# Patient Record
Sex: MALE | Race: WHITE | URBAN - METROPOLITAN AREA
[De-identification: names, ages, dates, MRNs, and addresses within clinical notes are randomized per-mention and may not be internally consistent; named-entity substitution may affect disease eponyms.]

---

## 2022-11-15 ENCOUNTER — OFFICE (OUTPATIENT)
Dept: URBAN - METROPOLITAN AREA CLINIC 6 | Facility: CLINIC | Age: 22
Setting detail: OPHTHALMOLOGY
End: 2022-11-15
Payer: COMMERCIAL

## 2022-11-15 DIAGNOSIS — G43.009: ICD-10-CM

## 2022-11-15 DIAGNOSIS — H50.52: ICD-10-CM

## 2022-11-15 DIAGNOSIS — H52.03: ICD-10-CM

## 2022-11-15 PROCEDURE — 92004 COMPRE OPH EXAM NEW PT 1/>: CPT | Performed by: OPHTHALMOLOGY

## 2022-11-15 PROCEDURE — 92015 DETERMINE REFRACTIVE STATE: CPT | Performed by: OPHTHALMOLOGY

## 2022-11-15 ASSESSMENT — REFRACTION_AUTOREFRACTION
OD_CYLINDER: -0.50
OS_AXIS: 030
OD_AXIS: 170
OS_SPHERE: +0.25
OS_CYLINDER: -0.25
OD_SPHERE: +0.25

## 2022-11-15 ASSESSMENT — REFRACTION_MANIFEST
OD_SPHERE: +0.25
OD_AXIS: 170
OS_AXIS: 030
OD_SPHERE: PLANO
OU_VA: 20/20-1
OU_VA: 20/20-1
OS_SPHERE: PLANO
OD_CYLINDER: -0.50
OS_CYLINDER: -0.25
OS_VA1: 20/20-1
OD_AXIS: 170
OS_SPHERE: +0.25
OS_AXIS: 030
OD_VA1: 20/20-2
OS_VA1: 20/20-1
OD_VA1: 20/20-2
OD_CYLINDER: -0.50
OS_CYLINDER: -0.25

## 2022-11-15 ASSESSMENT — AXIALLENGTH_DERIVED
OS_AL: 24.7954
OD_AL: 24.8493
OD_AL: 24.8493
OS_AL: 24.7954

## 2022-11-15 ASSESSMENT — KERATOMETRY
OD_AXISANGLE_DEGREES: 085
OS_K1POWER_DIOPTERS: 39.75
OD_K1POWER_DIOPTERS: 39.50
OS_K2POWER_DIOPTERS: 40.75
OD_K2POWER_DIOPTERS: 41.00
OS_AXISANGLE_DEGREES: 090

## 2022-11-15 ASSESSMENT — TONOMETRY
OD_IOP_MMHG: 17
OS_IOP_MMHG: 17

## 2022-11-15 ASSESSMENT — SPHEQUIV_DERIVED
OS_SPHEQUIV: 0.125
OS_SPHEQUIV: 0.125
OD_SPHEQUIV: 0
OD_SPHEQUIV: 0

## 2022-11-15 ASSESSMENT — VISUAL ACUITY
OD_BCVA: 20/25+1
OS_BCVA: 20/20-2

## 2022-11-15 ASSESSMENT — CONFRONTATIONAL VISUAL FIELD TEST (CVF)
OD_FINDINGS: FULL
OS_FINDINGS: FULL

## 2022-11-16 PROBLEM — H50.52 EXOPHORIA: Status: ACTIVE | Noted: 2022-11-15

## 2022-11-16 PROBLEM — G43.009 MIGRAINE-W/O AURA: Status: ACTIVE | Noted: 2022-11-15

## 2022-12-20 PROBLEM — Z00.00 ENCOUNTER FOR PREVENTIVE HEALTH EXAMINATION: Status: ACTIVE | Noted: 2022-12-20

## 2023-01-12 ENCOUNTER — APPOINTMENT (OUTPATIENT)
Dept: OTOLARYNGOLOGY | Facility: CLINIC | Age: 23
End: 2023-01-12
Payer: MEDICAID

## 2023-01-12 VITALS
HEART RATE: 65 BPM | TEMPERATURE: 98.1 F | HEIGHT: 75 IN | BODY MASS INDEX: 30.46 KG/M2 | DIASTOLIC BLOOD PRESSURE: 78 MMHG | SYSTOLIC BLOOD PRESSURE: 119 MMHG | WEIGHT: 245 LBS

## 2023-01-12 DIAGNOSIS — Z78.9 OTHER SPECIFIED HEALTH STATUS: ICD-10-CM

## 2023-01-12 DIAGNOSIS — Z83.3 FAMILY HISTORY OF DIABETES MELLITUS: ICD-10-CM

## 2023-01-12 PROCEDURE — 99204 OFFICE O/P NEW MOD 45 MIN: CPT

## 2023-01-12 NOTE — ASSESSMENT
[FreeTextEntry1] : Pt with 3 separate lesions in the neck that are suspicious for branchial cleft cysts.  \par \par R sided lesion may be solid rather than cystic.  Will obtain FNA and pt will f/u.  In all likelihood we will need to remove all three lesions.  Pt questioned whether observation was a viable option, since they cause him no discomfort.  I explained that it is an option, but we also discussed the potential risks that he would incur, including the potential for infection and the increased difficulty that would be associated with removal after nay infection.  \par \par Pt to f/u after the FNA's are done.

## 2023-01-12 NOTE — HISTORY OF PRESENT ILLNESS
[de-identified] : 22 year old male referred by Dr Baltazar for neck mass. States had Strep throat 2 months ago,developed a peritonsillar abscess, abscess was drained, and  CT was done where Dr Baltazar found more neck masses. States peritonsillar abscess returned a second time, resolved on its own. States currently feels breathing is restricted, feels there is something inside his throat, swallowing is not painful but uncomfortable, has a lot of phlegm production throughout the day, able to cough green phlegm at times and has constant throat clearing. Denies odynophagia, dysphonia, night sweats, chills, fevers, or otalgia.\par

## 2023-01-12 NOTE — DATA REVIEWED
[de-identified] : \par Office Location: [address] [place] [address], [place], [address]\par Office Phone: [phone]\par Office Fax: [phone]\par PATIENT NAME: Maryana Minaya\par PATIENT PHONE NUMBER: [phone]\par PATIENT ID: [id number]\par : 2000\par DATE OF EXAM: 2023\par R. Phys. Name: Chris Baltazar\par R. Phys. Address: [address] [place] [address], [place] [address]\par R. Phys. Phone: [phone]\par FINE NEEDLE ASPIRATION NECK WITH ULTRASOUND GUIDANCE\par \par HISTORY: M54.2 Cervical/ Neck Pain\par \par Following obtaining informed consent with the risks explained including but not\par limited to hemorrhage, the patient's neck was examined and the suspicious left\par neck, level 2 lymph node was identified. The overlying skin was prepped in the\par usual sterile fashion. Using real-time gray-scale ultrasound imaging, a\par 27-gauge needle was advanced into the lymph node and specimen was obtained.\par This was placed in the appropriate preservative solution. A second "pass" was\par made in similar fashion for specimen to be placed in RPMI solution. The patient\par tolerated the procedure well. A band-aid was applied to the needle insertion\par sites.\par \par IMPRESSION:\par \par \par Successful ultrasound guided fine needle aspiration biopsy of left neck, level 2\par lymph node. R59.0\par \par Signed by: Joselito Coughlin MD\par Signed Date: 2023 4:56 PM EST\par \par \par \par SIGNED BY: Joselito Coughlin M.D., Ext. [id number] 2023 04:56 PM\par \par ADDENDUM:\par This is an addendum to the ultrasound-guided fine-needle aspiration biopsy of\par the left neck, level 2 lymph node on 2023\par \par Final pathology report for the left cervical, level 2 lymph node reveals:\par \par Benign-appearing squamous cells and lymphoid cells noted.\par Differential diagnosis includes tonsillar cells, squamous cell-lined lesion,\par such as bronchial cleft cyst or a lymphoepithelial cyst.\par Flow cytometry reveals: Features of a lymphoproliferative disorder are not seen.\par \par Follow-up ultrasound exams to confirm stability/regression are recommended.\par \par \par \par Flores at Dr. Baltazar's office confirmed receipt of this report on 1/10/2023 and\par stated that the report was sent to the doctor for their review\par \par Signed by: Joselito Coughlin MD\par Signed Date: 2023 4:01 PM EST\par \par \par \par IMPRESSION:\par \par \par Successful ultrasound guided fine needle aspiration biopsy of left neck, level 2\par lymph node. R59.0\par \par Signed by: Joselito Coughlin MD\par Signed Date: 2023 4:56 PM EST\par \par \par \par SIGNED BY: Joselito Coughlin M.D., Ext. [id number] 2023 04:56 PM\par \par ADDENDUM:\par This is an addendum to the ultrasound-guided fine-needle aspiration biopsy of\par the left neck, level 2 lymph node on 2023\par \par Final pathology report for the left cervical, level 2 lymph node reveals:\par \par Benign-appearing squamous cells and lymphoid cells noted.\par Differential diagnosis includes tonsillar cells, squamous cell-lined lesion,\par such as bronchial cleft cyst or a lymphoepithelial cyst.\par Flow cytometry reveals: Features of a lymphoproliferative disorder are not seen.\par \par Follow-up ultrasound exams to confirm stability/regression are recommended.\par \par \par \par Flores at Dr. Baltazar's office confirmed receipt of this report on 1/10/2023 and\par stated that the report was sent to the doctor for their review\par \par Signed by: Joselito Coughlin MD\par Signed Date: 2023 4:01 PM EST\par \par  [de-identified] : IMAGES REVIEWED:\par \par Office Location: [address] [place] [address], [place], [address]\par Office Phone: [phone]\par Office Fax: [phone]\par PATIENT NAME: Maryana Minaya\par PATIENT PHONE NUMBER: [phone]\par PATIENT ID: [id number]\par : 2000\par DATE OF EXAM: 2023\par R. Phys. Name: Chris Baltazar\par R. Phys. Address: [address] [place] [address], [address], [facility]\par R. Phys. Phone: [phone]\par MRI CHEST WITHOUT AND WITH CONTRAST\par \par HISTORY: Cystic neck lesion. R53.83 Fatigue\par \par TECHNIQUE: Pre and postcontrast MRI of the chest was performed on a 3 Laura\par magnet. 19 cc Gadoterate Meglumine was administered.\par \par COMPARISON: None.\par \par FINDINGS:\par \par Thyroid/neck: Demonstrates homogeneous signal and normal size. T\par \par here is a nonenhancing cystic lesion in the midline anterior lower neck along\par the inferior aspect of the thyroid demonstrating a fluid/fluid measuring 4.7 x\par 3.3 x 3.9 cm.\par \par Mediastinum: The heart is normal in size. No pericardial effusion. The pulmonary\par arteries and thoracic aorta are normal caliber. No evidence of central pulmonary\par pulmonary embolism.\par \par Lymph nodes: No lymphadenopathy.\par \par Lungs: No parenchymal signal abnormality. No pleural effusion.\par \par Musculoskeletal: No aggressive osseous lesions.\par \par Upper abdomen: Unremarkable included upper abdominal organs.\par \par IMPRESSION:\par \par \par \par Nonspecific midline lower neck cyst abutting the inferior aspect of the thyroid.\par \par Signed by: Pastor Mccall MD\par Signed Date: 1/10/2023 3:23 PM EST\par \par \par \par SIGNED BY: Pastor Mccall M.D., Ext. [id number] 01/10/2023 03:23 PM\par \par IMPRESSION:\par \par \par \par Nonspecific midline lower neck cyst abutting the inferior aspect of the thyroid.\par \par Signed by: Pastor Mccall MD\par Signed Date: 1/10/2023 3:23 PM EST\par \par \par Office Location: [address] [place] [address] [place], [address]\par Office Phone: [phone]\par Office Fax: [phone]\par PATIENT NAME: Maryana Minaya\par PATIENT PHONE NUMBER: [phone]\par PATIENT ID: [id number]\par : 2000\par DATE OF EXAM: 12/15/2022\par R. Phys. Name: Chris Baltazar\par R. Phys. Address: [address] [place] [address], [place] [address]\par R. Phys. Phone: [phone]\par MRI-ORBIT FACE NECK PRE AND POST IV CONTRAST\par \par HISTORY: M54.81 Head/Neck Pain\par \par TECHNIQUE: Multiplanar multisequence MRI of the Neck was performed. The study\par was performed on a 1.5 Laura high field wide bore magnet.\par \par COMPARISON: None available.\par \par FINDINGS:\par \par Lymph nodes: There is a 3.9 x 2.6 x 2.1 cm enhancing mass with an approximately\par 16 mm cystic component in the left lateral neck, which may reflect\par pathologically enlarged left level 2A lymph node. There is an enlarged right\par level 2A lymph node measuring 3.5 x 1.9 x 1.5 cm.\par \par Aerodigestive structures: There is significant enlargement of bilateral palatine\par tonsils with small cystic components.\par \par Thyroid gland: Imaged only imaged entirely only on coronal sequences with no\par focal masses identified.\par \par Parotid and submandibular glands: Unremarkable.\par \par Paranasal sinuses: Clear.\par \par Mastoid air cells: Clear.\par \par Partially visualized orbits and intracranial structures: Unremarkable\par \par Other: Incompletely imaged is a cystic-appearing lesion anterior to the trachea.\par \par IMPRESSION:\par \par \par Bilateral level 2A lymphadenopathy with a cystic component on the left,\par nonspecific. Tissue sampling is recommended.\par \par Significant bilateral palatine tonsillar enlargement, nonspecific. Correlation\par with direct visualization is recommended.\par \par Cystic-appearing lesion anterior to the trachea, incompletely imaged. Evaluation\par with a contrast-enhanced MRI or CT of the chest is recommended.\par \par I requested that my report be faxed to the referring physician's office with\par confirmation of receipt.\par \par \par \par \par Signed by: Ana Maria Carrillo MD\par Signed Date: 12/15/2022 10:31 PM EST\par \par \par \par SIGNED BY: Ana Maria Carrillo M.D., Ext. [id number] 12/15/2022 10:31 PM

## 2023-01-12 NOTE — CONSULT LETTER
[Consult Letter:] : I had the pleasure of evaluating your patient, [unfilled]. [Please see my note below.] : Please see my note below. [Consult Closing:] : Thank you very much for allowing me to participate in the care of this patient.  If you have any questions, please do not hesitate to contact me. [Sincerely,] : Sincerely, [Dear  ___] : Dear  [unfilled], [FreeTextEntry2] : Chris Baltazar MD [FreeTextEntry3] : Jason Perry MD, FACS\par Chief of Otolaryngology at [facility] [place]\par \par Dept. of Otolaryngology\par [facility]\par

## 2023-01-12 NOTE — PHYSICAL EXAM
[de-identified] : Bilateral, palpable, NT level II neck masses vs cysts.  3rd cystic lesion at sternal notch, extending bleow sternum. [Midline] : trachea located in midline position [Normal] : no rashes

## 2023-01-23 ENCOUNTER — RESULT REVIEW (OUTPATIENT)
Age: 23
End: 2023-01-23

## 2023-01-30 ENCOUNTER — APPOINTMENT (OUTPATIENT)
Dept: ULTRASOUND IMAGING | Facility: CLINIC | Age: 23
End: 2023-01-30

## 2023-02-02 ENCOUNTER — OUTPATIENT (OUTPATIENT)
Dept: OUTPATIENT SERVICES | Facility: HOSPITAL | Age: 23
LOS: 1 days | End: 2023-02-02
Payer: MEDICAID

## 2023-02-02 ENCOUNTER — RESULT REVIEW (OUTPATIENT)
Age: 23
End: 2023-02-02

## 2023-02-02 ENCOUNTER — APPOINTMENT (OUTPATIENT)
Dept: ULTRASOUND IMAGING | Facility: CLINIC | Age: 23
End: 2023-02-02
Payer: MEDICAID

## 2023-02-02 ENCOUNTER — APPOINTMENT (OUTPATIENT)
Dept: ULTRASOUND IMAGING | Facility: CLINIC | Age: 23
End: 2023-02-02

## 2023-02-02 DIAGNOSIS — Z00.8 ENCOUNTER FOR OTHER GENERAL EXAMINATION: ICD-10-CM

## 2023-02-02 DIAGNOSIS — R22.1 LOCALIZED SWELLING, MASS AND LUMP, NECK: ICD-10-CM

## 2023-02-02 PROCEDURE — 10006 FNA BX W/US GDN EA ADDL: CPT

## 2023-02-02 PROCEDURE — 88173 CYTOPATH EVAL FNA REPORT: CPT | Mod: 26

## 2023-02-02 PROCEDURE — 88305 TISSUE EXAM BY PATHOLOGIST: CPT

## 2023-02-02 PROCEDURE — 88305 TISSUE EXAM BY PATHOLOGIST: CPT | Mod: 26

## 2023-02-02 PROCEDURE — 88173 CYTOPATH EVAL FNA REPORT: CPT

## 2023-02-02 PROCEDURE — 10005 FNA BX W/US GDN 1ST LES: CPT

## 2023-02-06 LAB — NON-GYNECOLOGICAL CYTOLOGY STUDY: SIGNIFICANT CHANGE UP

## 2023-03-01 ENCOUNTER — APPOINTMENT (OUTPATIENT)
Dept: OTOLARYNGOLOGY | Facility: CLINIC | Age: 23
End: 2023-03-01
Payer: MEDICAID

## 2023-03-01 VITALS
DIASTOLIC BLOOD PRESSURE: 77 MMHG | WEIGHT: 245 LBS | BODY MASS INDEX: 43.41 KG/M2 | TEMPERATURE: 97.7 F | HEIGHT: 63 IN | SYSTOLIC BLOOD PRESSURE: 136 MMHG | HEART RATE: 70 BPM

## 2023-03-01 DIAGNOSIS — R22.1 LOCALIZED SWELLING, MASS AND LUMP, NECK: ICD-10-CM

## 2023-03-01 PROCEDURE — 99214 OFFICE O/P EST MOD 30 MIN: CPT

## 2023-03-01 NOTE — ASSESSMENT
[FreeTextEntry1] : Pt to schedule excision of both cystic lesions in his neck. Risks d/w pt, including, but not limited to, bleeding, infection, nerve injury, and the potential for keloid scarring. Pt understands and wishes to proceed.

## 2023-03-01 NOTE — PHYSICAL EXAM
[Midline] : trachea located in midline position [Normal] : no rashes [de-identified] : Bilateral, palpable, NT level II neck masses vs cysts.  3rd cystic lesion at sternal notch, extending bleow sternum. [de-identified] : Edematous [de-identified] : 3+

## 2023-03-01 NOTE — HISTORY OF PRESENT ILLNESS
[de-identified] : 22 year old male presents for follow up for neck mass.  He reports new symptoms including a sensation  that his breathing is restricted, feels there is something inside his throat, swallowing is not painful but uncomfortable, has a lot of phlegm production throughout the day, able to cough green phlegm at times, raspy voice at times and has constant throat clearing. Denies odynophagia, night sweats, chills, fevers, or otalgia.\par \par

## 2023-03-01 NOTE — DATA REVIEWED
[de-identified] : \par  US Fine Needle Aspiration Lymph Node 1st Lesion             Amended\par \par No Documents Attached\par \par \par \par \par   EXAM: [id number] - US FNA LYMPH NODE 1ST LES SISC  - ORDERED BY: MAKEDA BYERS\par \par EXAM: [id number] - US FNA OTHER 1ST LES SISC  - ORDERED BY: MAKEDA BYERS\par \par \par *** ADDENDUM ***\par \par CYTOLOGY RESULTS RECEIVED:\par \par The cytology report of the right level 2 cervical lymph node and suprasternal cystic mass FNA biopsy indicates the following results:\par \par Site #1. Right cervical level 2 lymph node negative for malignant cells. Flow cytometry demonstrates no diagnostic abnormalities.\par \par Site #2. Suprasternal cystic mass yielded nondiagnostic. Acellular specimen.\par \par IMPRESSION:  Results are BENIGN AND CONCORDANT.\par \par RECOMMENDATION:  Clinical Follow Up.\par \par --- End of Report ---\par \par *** END OF ADDENDUM ***\par \par \par PROCEDURE DATE:  02/02/2023\par \par \par \par INTERPRETATION:  Clinical Information: 22-year-old male who presents for ultrasound-guided fine-needle aspiration biopsy of an enlarged right cervical level 2 lymph node, and of a cystic mass located sternal notch region.\par \par Comparison: Neck ultrasound 1/5/2023, MR chest 1/5/2023 and MR neck 12/5/2022.\par \par COMMENTS:\par \par Consent: After the patient's identification was confirmed, the procedure was explained to the patient. The potential risks, benefits, and alternatives to fine needle aspiration biopsy were discussed with the patient, including possible complications of bleeding. Informed consent was then obtained.\par \par Cervical Ultrasound: Real-time sonography of the right cervical region and sternal notch region was performed prior to the procedure. Examination again demonstrates an enlarged right cervical level 2 lymph node with thickened cortex and a cystic mass noted in the sternal notch region. Plan is for 2 site ultrasound-guided fine-needle aspiration biopsy.\par \par Site #1. Midline sternal notch region 4.6 cm cystic mass located inferior to the thyroid gland.\par \par Site #2. Right cervical level 2 lymph node 2.5 cm.\par \par Technique:  Utilizing aseptic technique with betadine and local anesthesia with 1% lidocaine 2 passes of 25 gauge needle and 22-gauge needle were made into the sternal notch cystic mass under direct real-time sonographic visualization. 3 passes of 25-gauge needles were made into the right cervical level 2 lymph node.\par \par Specimen sent for cytologic evaluation and flow cytometry.\par \par The patient tolerated the procedure well without complication.\par \par IMPRESSION:\par \par Successful ultrasound-guided fine-needle aspiration biopsy of a 4.6 cm cystic mass located mid line sternal notch region, and a 2.5 cm right cervical level 2 lymph node.\par \par Specimen sent for cytologic evaluation and flow cytometry.\par \par --- End of Report ---\par \par \par ***Please see the addendum at the top of this report. It may contain additional important information or changes.****\par \par \par \par MILA VASQUEZ MD; Attending Radiologist\par This document has been electronically signed. Feb 2 2023 12:02PM\par 1st Addendum: MILA VASQUEZ MD; Attending Radiologist\par The first addendum was electronically signed on: Feb 13 2023 10:47AM.\par \par  \par \par  Ordered by: MAKEDA BYERS       Collected/Examined: [id number] 11:56AM       \par Verified by: MAKEDA BYERS [id number] 06:16PM       \par  Result Communication: No patient communication needed at this time;\par Stage: Amended       \par  Performed at: [facility]       Resulted: [id number] 10:44AM       Last Updated: [id number] 06:16PM       Accession: [id number]        [de-identified] : MRI ([facility] 1/5/23) - Images reviewed and show a midline, low anterior cystic lesion and a L level  II / III neck cyst.

## 2023-03-01 NOTE — CONSULT LETTER
[Consult Letter:] : I had the pleasure of evaluating your patient, [unfilled]. [Please see my note below.] : Please see my note below. [Consult Closing:] : Thank you very much for allowing me to participate in the care of this patient.  If you have any questions, please do not hesitate to contact me. [Sincerely,] : Sincerely, [Dear  ___] : Dear  [unfilled], [FreeTextEntry2] : Chris Baltazar MD [FreeTextEntry3] : Jason Perry MD, FACS\par Chief of Otolaryngology and Head & Neck Surgery\par [facility]\par  - Dept. of Otolaryngology\par [facility]

## 2023-03-31 ENCOUNTER — OUTPATIENT (OUTPATIENT)
Dept: OUTPATIENT SERVICES | Facility: HOSPITAL | Age: 23
LOS: 1 days | End: 2023-03-31
Payer: COMMERCIAL

## 2023-03-31 VITALS
DIASTOLIC BLOOD PRESSURE: 78 MMHG | TEMPERATURE: 98 F | HEART RATE: 57 BPM | RESPIRATION RATE: 16 BRPM | SYSTOLIC BLOOD PRESSURE: 100 MMHG | OXYGEN SATURATION: 100 % | HEIGHT: 75 IN | WEIGHT: 244.71 LBS

## 2023-03-31 DIAGNOSIS — Q18.0 SINUS, FISTULA AND CYST OF BRANCHIAL CLEFT: ICD-10-CM

## 2023-03-31 DIAGNOSIS — Z01.818 ENCOUNTER FOR OTHER PREPROCEDURAL EXAMINATION: ICD-10-CM

## 2023-03-31 DIAGNOSIS — Z13.89 ENCOUNTER FOR SCREENING FOR OTHER DISORDER: ICD-10-CM

## 2023-03-31 DIAGNOSIS — Z29.9 ENCOUNTER FOR PROPHYLACTIC MEASURES, UNSPECIFIED: ICD-10-CM

## 2023-03-31 DIAGNOSIS — Z78.9 OTHER SPECIFIED HEALTH STATUS: Chronic | ICD-10-CM

## 2023-03-31 DIAGNOSIS — R22.1 LOCALIZED SWELLING, MASS AND LUMP, NECK: ICD-10-CM

## 2023-03-31 LAB
A1C WITH ESTIMATED AVERAGE GLUCOSE RESULT: 5.2 % — SIGNIFICANT CHANGE UP (ref 4–5.6)
ANION GAP SERPL CALC-SCNC: 10 MMOL/L — SIGNIFICANT CHANGE UP (ref 5–17)
APTT BLD: 31.8 SEC — SIGNIFICANT CHANGE UP (ref 27.5–35.5)
BASOPHILS # BLD AUTO: 0.06 K/UL — SIGNIFICANT CHANGE UP (ref 0–0.2)
BASOPHILS NFR BLD AUTO: 1 % — SIGNIFICANT CHANGE UP (ref 0–2)
BUN SERPL-MCNC: 16.2 MG/DL — SIGNIFICANT CHANGE UP (ref 8–20)
CALCIUM SERPL-MCNC: 9.4 MG/DL — SIGNIFICANT CHANGE UP (ref 8.4–10.5)
CHLORIDE SERPL-SCNC: 101 MMOL/L — SIGNIFICANT CHANGE UP (ref 96–108)
CO2 SERPL-SCNC: 24 MMOL/L — SIGNIFICANT CHANGE UP (ref 22–29)
CREAT SERPL-MCNC: 1 MG/DL — SIGNIFICANT CHANGE UP (ref 0.5–1.3)
EGFR: 108 ML/MIN/1.73M2 — SIGNIFICANT CHANGE UP
EOSINOPHIL # BLD AUTO: 0.14 K/UL — SIGNIFICANT CHANGE UP (ref 0–0.5)
EOSINOPHIL NFR BLD AUTO: 2.2 % — SIGNIFICANT CHANGE UP (ref 0–6)
ESTIMATED AVERAGE GLUCOSE: 103 MG/DL — SIGNIFICANT CHANGE UP (ref 68–114)
GLUCOSE SERPL-MCNC: 91 MG/DL — SIGNIFICANT CHANGE UP (ref 70–99)
HCT VFR BLD CALC: 45 % — SIGNIFICANT CHANGE UP (ref 39–50)
HGB BLD-MCNC: 14.6 G/DL — SIGNIFICANT CHANGE UP (ref 13–17)
IMM GRANULOCYTES NFR BLD AUTO: 0.2 % — SIGNIFICANT CHANGE UP (ref 0–0.9)
INR BLD: 1.15 RATIO — SIGNIFICANT CHANGE UP (ref 0.88–1.16)
LYMPHOCYTES # BLD AUTO: 2.09 K/UL — SIGNIFICANT CHANGE UP (ref 1–3.3)
LYMPHOCYTES # BLD AUTO: 33.4 % — SIGNIFICANT CHANGE UP (ref 13–44)
MCHC RBC-ENTMCNC: 27.3 PG — SIGNIFICANT CHANGE UP (ref 27–34)
MCHC RBC-ENTMCNC: 32.4 GM/DL — SIGNIFICANT CHANGE UP (ref 32–36)
MCV RBC AUTO: 84.3 FL — SIGNIFICANT CHANGE UP (ref 80–100)
MONOCYTES # BLD AUTO: 0.39 K/UL — SIGNIFICANT CHANGE UP (ref 0–0.9)
MONOCYTES NFR BLD AUTO: 6.2 % — SIGNIFICANT CHANGE UP (ref 2–14)
NEUTROPHILS # BLD AUTO: 3.56 K/UL — SIGNIFICANT CHANGE UP (ref 1.8–7.4)
NEUTROPHILS NFR BLD AUTO: 57 % — SIGNIFICANT CHANGE UP (ref 43–77)
PLATELET # BLD AUTO: 227 K/UL — SIGNIFICANT CHANGE UP (ref 150–400)
POTASSIUM SERPL-MCNC: 5 MMOL/L — SIGNIFICANT CHANGE UP (ref 3.5–5.3)
POTASSIUM SERPL-SCNC: 5 MMOL/L — SIGNIFICANT CHANGE UP (ref 3.5–5.3)
PROTHROM AB SERPL-ACNC: 13.4 SEC — SIGNIFICANT CHANGE UP (ref 10.5–13.4)
RBC # BLD: 5.34 M/UL — SIGNIFICANT CHANGE UP (ref 4.2–5.8)
RBC # FLD: 12.7 % — SIGNIFICANT CHANGE UP (ref 10.3–14.5)
SODIUM SERPL-SCNC: 135 MMOL/L — SIGNIFICANT CHANGE UP (ref 135–145)
WBC # BLD: 6.25 K/UL — SIGNIFICANT CHANGE UP (ref 3.8–10.5)
WBC # FLD AUTO: 6.25 K/UL — SIGNIFICANT CHANGE UP (ref 3.8–10.5)

## 2023-03-31 PROCEDURE — 80048 BASIC METABOLIC PNL TOTAL CA: CPT

## 2023-03-31 PROCEDURE — 85730 THROMBOPLASTIN TIME PARTIAL: CPT

## 2023-03-31 PROCEDURE — 36415 COLL VENOUS BLD VENIPUNCTURE: CPT

## 2023-03-31 PROCEDURE — 85610 PROTHROMBIN TIME: CPT

## 2023-03-31 PROCEDURE — G0463: CPT

## 2023-03-31 PROCEDURE — 83036 HEMOGLOBIN GLYCOSYLATED A1C: CPT

## 2023-03-31 PROCEDURE — 85025 COMPLETE CBC W/AUTO DIFF WBC: CPT

## 2023-03-31 RX ORDER — CEFAZOLIN SODIUM 1 G
2000 VIAL (EA) INJECTION ONCE
Refills: 0 | Status: DISCONTINUED | OUTPATIENT
Start: 2023-03-31 | End: 2023-03-31

## 2023-03-31 RX ORDER — SODIUM CHLORIDE 9 MG/ML
3 INJECTION INTRAMUSCULAR; INTRAVENOUS; SUBCUTANEOUS EVERY 8 HOURS
Refills: 0 | Status: DISCONTINUED | OUTPATIENT
Start: 2023-03-31 | End: 2023-04-15

## 2023-03-31 RX ORDER — CEFAZOLIN SODIUM 1 G
2000 VIAL (EA) INJECTION ONCE
Refills: 0 | Status: DISCONTINUED | OUTPATIENT
Start: 2023-03-31 | End: 2023-04-15

## 2023-03-31 RX ORDER — ACETAMINOPHEN 500 MG
975 TABLET ORAL ONCE
Refills: 0 | Status: DISCONTINUED | OUTPATIENT
Start: 2023-03-31 | End: 2023-04-15

## 2023-03-31 NOTE — H&P PST ADULT - ENMT COMMENTS
Bilateral, palpable, NT level II neck masses vs cysts. 3rd cystic lesion at sternal notch palpable mass x2 left side neck

## 2023-03-31 NOTE — H&P PST ADULT - HISTORY OF PRESENT ILLNESS
21yo M p/w left side neck mass x2, patient reports a/w tenderness, denies pain. He does reports slight difficulty swallowing a/w mass. Denies night sweats, chills, fevers, or otalgia.  As per DR. Perry CT/MRI revealed midline, low anterior cystic lesion and a L level II / III neck cyst, biopsy performed 02/2023 and cytology of right level 2 cervical lymph node and suprasternal cystic mass FNA biopsy revealed right cervical level 2 lymph node negative for malignant cells, flow cytometry demonstrates no diagnostic abnormalities, suprasternal cystic mass yielded nondiagnostic, acellular specimen, results are BENIGN AND CONCORDANT, and patient is now planned for excision of both cystic lesions in his neck. Otherwise patient reports feeling well today and appears in no acute distress.      23yo M p/w left side neck mass x2, patient reports a/w tenderness, denies pain. He does reports slight difficulty swallowing a/w mass. Denies night sweats, chills, fevers, or otalgia.  As per DR. Perry CT/MRI revealed midline, low anterior cystic lesion and a L level II / III neck cyst, biopsy performed 02/2023 and cytology of right level 2 cervical lymph node and suprasternal cystic mass FNA biopsy revealed right cervical level 2 lymph node negative for malignant cells, flow cytometry demonstrates no diagnostic abnormalities, suprasternal cystic mass yielded nondiagnostic, acellular specimen, pathology results benign, and patient is now planned for excision of both cystic lesions in his neck. Otherwise patient reports feeling well today and appears in no acute distress.

## 2023-03-31 NOTE — H&P PST ADULT - PROBLEM SELECTOR PLAN 3
cap 4- Moderate Risk,  SCDs ordered for day of procedure.  Surgical team to assess need for VTE prophylaxis

## 2023-03-31 NOTE — H&P PST ADULT - ASSESSMENT
23yo M p/w left side neck mass x2, patient reports a/w tenderness, denies pain. He does reports slight difficulty swallowing a/w mass. Denies night sweats, chills, fevers, or otalgia.  As per DR. 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/2016    OPIOID RISK TOOL    DARLING EACH BOX THAT APPLIES AND ADD TOTALS AT THE END    FAMILY HISTORY OF SUBSTANCE ABUSE                 FEMALE         MALE                                                Alcohol                             [  ]1 pt          [  ]3pts                                               Illegal Durgs                     [  ]2 pts        [  ]3pts                                               Rx Drugs                           [  ]4 pts        [  ]4 pts    PERSONAL HISTORY OF SUBSTANCE ABUSE                                                                                          Alcohol                             [  ]3 pts       [  ]3 pts                                               Illegal Drugs                     [  ]4 pts        [  ]4 pts                                               Rx Drugs                           [  ]5 pts        [  ]5 pts    AGE BETWEEN 16-45 YEARS                                      [ x ]1 pt         [  ]1 pt    HISTORY OF PREADOLESCENT   SEXUAL ABUSE                                                             [  ]3 pts        [  ]0pts    PSYCHOLOGICAL DISEASE                     ADD, OCD, Bipolar, Schizophrenia        [  ]2 pts         [  ]2 pts                      Depression                                               [  ]1 pt           [  ]1 pt           SCORING TOTAL   (add numbers and type here)              (1)                                     A score of 3 or lower indicated LOW risk for future opioid abuse  A score of 4 to 7 indicated moderate risk for future opioid abuse  A score of 8 or higher indicates a high risk for opioid abuse        CAPRINI SCORE    AGE RELATED RISK FACTORS                                                             [ ] Age 41-60 years                                            (1 Point)  [ ] Age: 61-74 years                                           (2 Points)                 [ ] Age= 75 years                                                (3 Points)             DISEASE RELATED RISK FACTORS                                                       [ ] Edema in the lower extremities                 (1 Point)                     [ ] Varicose veins                                               (1 Point)                                 [ x] BMI > 25 Kg/m2                                            (1 Point)                                  [ ] Serious infection (ie PNA)                            (1 Point)                     [ ] Lung disease ( COPD, Emphysema)            (1 Point)                                                                          [ ] Acute myocardial infarction                         (1 Point)                  [ ] Congestive heart failure (in the previous month)  (1 Point)         [ ] Inflammatory bowel disease                            (1 Point)                  [ ] Central venous access, PICC or Port               (2 points)       (within the last month)                                                                [ ] Stroke (in the previous month)                        (5 Points)    [ ] Previous or present malignancy                       (2 points)                                                                                                                                                         HEMATOLOGY RELATED FACTORS                                                         [ ] Prior episodes of VTE                                     (3 Points)                     [ ] Positive family history for VTE                      (3 Points)                  [ ] Prothrombin 22188 A                                     (3 Points)                     [ ] Factor V Leiden                                                (3 Points)                        [ ] Lupus anticoagulants                                      (3 Points)                                                           [ ] Anticardiolipin antibodies                              (3 Points)                                                       [ ] High homocysteine in the blood                   (3 Points)                                             [ ] Other congenital or acquired thrombophilia      (3 Points)                                                [ ] Heparin induced thrombocytopenia                  (3 Points)                                        MOBILITY RELATED FACTORS  [ ] Bed rest                                                         (1 Point)  [ ] Plaster cast                                                    (2 points)  [ ] Bed bound for more than 72 hours           (2 Points)    GENDER SPECIFIC FACTORS  [ ] Pregnancy or had a baby within the last month   (1 Point)  [ ] Post-partum < 6 weeks                                   (1 Point)  [ ] Hormonal therapy  or oral contraception   (1 Point)  [ ] History of pregnancy complications              (1 point)  [ ] Unexplained or recurrent              (1 Point)    OTHER RISK FACTORS                                           (1 Point)  [x ] BMI >40, smoking, diabetes requiring insulin, chemotherapy  blood transfusions and length of surgery over 2 hours    SURGERY RELATED RISK FACTORS  [ ]  Section within the last month     (1 Point)  [ ] Minor surgery                                                  (1 Point)  [ ] Arthroscopic surgery                                       (2 Points)  [x ] Planned major surgery lasting more            (2 Points)      than 45 minutes     [ ] Elective hip or knee joint replacement       (5 points)       surgery                                                TRAUMA RELATED RISK FACTORS  [ ] Fracture of the hip, pelvis, or leg                       (5 Points)  [ ] Spinal cord injury resulting in paralysis             (5 points)       (in the previous month)    [ ] Paralysis  (less than 1 month)                             (5 Points)  [ ] Multiple Trauma within 1 month                        (5 Points)    Total Score [   4     ]    Caprini Score 0-2: Low Risk, NO VTE prophylaxis required for most patients, encourage ambulation  Caprini Score 3-6: Moderate Risk , pharmacologic VTE prophylaxis is indicated for most patients (in the absence of contraindications)  Caprini Score Greater than or =7: High risk, pharmocologic VTE prophylaxis indicated for most patients (in the absence of contraindications)                               23yo M p/w left side neck mass x2, patient reports a/w tenderness, denies pain. He does reports slight difficulty swallowing a/w mass. Denies night sweats, chills, fevers, or otalgia.  As per DR. 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    OPIOID RISK TOOL    DARLING EACH BOX THAT APPLIES AND ADD TOTALS AT THE END    FAMILY HISTORY OF SUBSTANCE ABUSE                 FEMALE         MALE                                                Alcohol                             [  ]1 pt          [  ]3pts                                               Illegal Durgs                     [  ]2 pts        [  ]3pts                                               Rx Drugs                           [  ]4 pts        [  ]4 pts    PERSONAL HISTORY OF SUBSTANCE ABUSE                                                                                          Alcohol                             [  ]3 pts       [  ]3 pts                                               Illegal Drugs                     [  ]4 pts        [  ]4 pts                                               Rx Drugs                           [  ]5 pts        [  ]5 pts    AGE BETWEEN 16-45 YEARS                                      [ x ]1 pt         [  ]1 pt    HISTORY OF PREADOLESCENT   SEXUAL ABUSE                                                             [  ]3 pts        [  ]0pts    PSYCHOLOGICAL DISEASE                     ADD, OCD, Bipolar, Schizophrenia        [  ]2 pts         [  ]2 pts                      Depression                                               [  ]1 pt           [  ]1 pt           SCORING TOTAL   (add numbers and type here)              (1)                                     A score of 3 or lower indicated LOW risk for future opioid abuse  A score of 4 to 7 indicated moderate risk for future opioid abuse  A score of 8 or higher indicates a high risk for opioid abuse        CAPRINI SCORE    AGE RELATED RISK FACTORS                                                             [ ] Age 41-60 years                                            (1 Point)  [ ] Age: 61-74 years                                           (2 Points)                 [ ] Age= 75 years                                                (3 Points)             DISEASE RELATED RISK FACTORS                                                       [ ] Edema in the lower extremities                 (1 Point)                     [ ] Varicose veins                                               (1 Point)                                 [ x] BMI > 25 Kg/m2                                            (1 Point)                                  [ ] Serious infection (ie PNA)                            (1 Point)                     [ ] Lung disease ( COPD, Emphysema)            (1 Point)                                                                          [ ] Acute myocardial infarction                         (1 Point)                  [ ] Congestive heart failure (in the previous month)  (1 Point)         [ ] Inflammatory bowel disease                            (1 Point)                  [ ] Central venous access, PICC or Port               (2 points)       (within the last month)                                                                [ ] Stroke (in the previous month)                        (5 Points)    [ ] Previous or present malignancy                       (2 points)                                                                                                                                                         HEMATOLOGY RELATED FACTORS                                                         [ ] Prior episodes of VTE                                     (3 Points)                     [ ] Positive family history for VTE                      (3 Points)                  [ ] Prothrombin 39827 A                                     (3 Points)                     [ ] Factor V Leiden                                                (3 Points)                        [ ] Lupus anticoagulants                                      (3 Points)                                                           [ ] Anticardiolipin antibodies                              (3 Points)                                                       [ ] High homocysteine in the blood                   (3 Points)                                             [ ] Other congenital or acquired thrombophilia      (3 Points)                                                [ ] Heparin induced thrombocytopenia                  (3 Points)                                        MOBILITY RELATED FACTORS  [ ] Bed rest                                                         (1 Point)  [ ] Plaster cast                                                    (2 points)  [ ] Bed bound for more than 72 hours           (2 Points)    GENDER SPECIFIC FACTORS  [ ] Pregnancy or had a baby within the last month   (1 Point)  [ ] Post-partum < 6 weeks                                   (1 Point)  [ ] Hormonal therapy  or oral contraception   (1 Point)  [ ] History of pregnancy complications              (1 point)  [ ] Unexplained or recurrent              (1 Point)    OTHER RISK FACTORS                                           (1 Point)  [x ] BMI >40, smoking, diabetes requiring insulin, chemotherapy  blood transfusions and length of surgery over 2 hours    SURGERY RELATED RISK FACTORS  [ ]  Section within the last month     (1 Point)  [ ] Minor surgery                                                  (1 Point)  [ ] Arthroscopic surgery                                       (2 Points)  [x ] Planned major surgery lasting more            (2 Points)      than 45 minutes     [ ] Elective hip or knee joint replacement       (5 points)       surgery                                                TRAUMA RELATED RISK FACTORS  [ ] Fracture of the hip, pelvis, or leg                       (5 Points)  [ ] Spinal cord injury resulting in paralysis             (5 points)       (in the previous month)    [ ] Paralysis  (less than 1 month)                             (5 Points)  [ ] Multiple Trauma within 1 month                        (5 Points)    Total Score [   4     ]    Caprini Score 0-2: Low Risk, NO VTE prophylaxis required for most patients, encourage ambulation  Caprini Score 3-6: Moderate Risk , pharmacologic VTE prophylaxis is indicated for most patients (in the absence of contraindications)  Caprini Score Greater than or =7: High risk, pharmocologic VTE prophylaxis indicated for most patients (in the absence of contraindications)                               21yo M p/w left side neck mass x2, patient reports a/w tenderness, denies pain. He does reports slight difficulty swallowing a/w mass. Denies night sweats, chills, fevers, or otalgia. As per DR. 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    OPIOID RISK TOOL    DARLING EACH BOX THAT APPLIES AND ADD TOTALS AT THE END    FAMILY HISTORY OF SUBSTANCE ABUSE                 FEMALE         MALE                                                Alcohol                             [  ]1 pt          [  ]3pts                                               Illegal Durgs                     [  ]2 pts        [  ]3pts                                               Rx Drugs                           [  ]4 pts        [  ]4 pts    PERSONAL HISTORY OF SUBSTANCE ABUSE                                                                                          Alcohol                             [  ]3 pts       [  ]3 pts                                               Illegal Drugs                     [  ]4 pts        [  ]4 pts                                               Rx Drugs                           [  ]5 pts        [  ]5 pts    AGE BETWEEN 16-45 YEARS                                      [ x ]1 pt         [  ]1 pt    HISTORY OF PREADOLESCENT   SEXUAL ABUSE                                                             [  ]3 pts        [  ]0pts    PSYCHOLOGICAL DISEASE                     ADD, OCD, Bipolar, Schizophrenia        [  ]2 pts         [  ]2 pts                      Depression                                               [  ]1 pt           [  ]1 pt           SCORING TOTAL   (add numbers and type here)              (1)                                     A score of 3 or lower indicated LOW risk for future opioid abuse  A score of 4 to 7 indicated moderate risk for future opioid abuse  A score of 8 or higher indicates a high risk for opioid abuse        CAPRINI SCORE    AGE RELATED RISK FACTORS                                                             [ ] Age 41-60 years                                            (1 Point)  [ ] Age: 61-74 years                                           (2 Points)                 [ ] Age= 75 years                                                (3 Points)             DISEASE RELATED RISK FACTORS                                                       [ ] Edema in the lower extremities                 (1 Point)                     [ ] Varicose veins                                               (1 Point)                                 [ x] BMI > 25 Kg/m2                                            (1 Point)                                  [ ] Serious infection (ie PNA)                            (1 Point)                     [ ] Lung disease ( COPD, Emphysema)            (1 Point)                                                                          [ ] Acute myocardial infarction                         (1 Point)                  [ ] Congestive heart failure (in the previous month)  (1 Point)         [ ] Inflammatory bowel disease                            (1 Point)                  [ ] Central venous access, PICC or Port               (2 points)       (within the last month)                                                                [ ] Stroke (in the previous month)                        (5 Points)    [ ] Previous or present malignancy                       (2 points)                                                                                                                                                         HEMATOLOGY RELATED FACTORS                                                         [ ] Prior episodes of VTE                                     (3 Points)                     [ ] Positive family history for VTE                      (3 Points)                  [ ] Prothrombin 66531 A                                     (3 Points)                     [ ] Factor V Leiden                                                (3 Points)                        [ ] Lupus anticoagulants                                      (3 Points)                                                           [ ] Anticardiolipin antibodies                              (3 Points)                                                       [ ] High homocysteine in the blood                   (3 Points)                                             [ ] Other congenital or acquired thrombophilia      (3 Points)                                                [ ] Heparin induced thrombocytopenia                  (3 Points)                                        MOBILITY RELATED FACTORS  [ ] Bed rest                                                         (1 Point)  [ ] Plaster cast                                                    (2 points)  [ ] Bed bound for more than 72 hours           (2 Points)    GENDER SPECIFIC FACTORS  [ ] Pregnancy or had a baby within the last month   (1 Point)  [ ] Post-partum < 6 weeks                                   (1 Point)  [ ] Hormonal therapy  or oral contraception   (1 Point)  [ ] History of pregnancy complications              (1 point)  [ ] Unexplained or recurrent              (1 Point)    OTHER RISK FACTORS                                           (1 Point)  [x ] BMI >40, smoking, diabetes requiring insulin, chemotherapy  blood transfusions and length of surgery over 2 hours    SURGERY RELATED RISK FACTORS  [ ]  Section within the last month     (1 Point)  [ ] Minor surgery                                                  (1 Point)  [ ] Arthroscopic surgery                                       (2 Points)  [x ] Planned major surgery lasting more            (2 Points)      than 45 minutes     [ ] Elective hip or knee joint replacement       (5 points)       surgery                                                TRAUMA RELATED RISK FACTORS  [ ] Fracture of the hip, pelvis, or leg                       (5 Points)  [ ] Spinal cord injury resulting in paralysis             (5 points)       (in the previous month)    [ ] Paralysis  (less than 1 month)                             (5 Points)  [ ] Multiple Trauma within 1 month                        (5 Points)    Total Score [   4     ]    Caprini Score 0-2: Low Risk, NO VTE prophylaxis required for most patients, encourage ambulation  Caprini Score 3-6: Moderate Risk , pharmacologic VTE prophylaxis is indicated for most patients (in the absence of contraindications)  Caprini Score Greater than or =7: High risk, pharmocologic VTE prophylaxis indicated for most patients (in the absence of contraindications)

## 2023-03-31 NOTE — H&P PST ADULT - NSANTHOSAYNRD_GEN_A_CORE
No. ARSALAN screening performed.  STOP BANG Legend: 0-2 = LOW Risk; 3-4 = INTERMEDIATE Risk; 5-8 = HIGH Risk

## 2023-03-31 NOTE — H&P PST ADULT - NSICDXPROCEDURE_GEN_ALL_CORE_FT
PROCEDURES:  Biopsy or excision, lesion, neck 31-Mar-2023 18:13:50 sinus fistula and brachial cleft cyst Marquita Harvey L

## 2023-03-31 NOTE — H&P PST ADULT - PROBLEM SELECTOR PLAN 1
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  Patient educated on surgical scrub, COVID testing, preadmission instructions, medical clearance and day of procedure medications, verbalizes understanding, teach back method utilized. 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  Patient educated on surgical scrub, COVID testing, preadmission instructions and day of procedure medications, verbalizes understanding, teach back method utilized.  surgeon requesting medical clearance, patient looking for primary care and will notify PST with contact information. 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  Patient educated on surgical scrub, COVID testing, preadmission instructions and day of procedure medications, verbalizes understanding, teach back method utilized.  surgeon requesting medical clearance. 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  Patient educated on surgical scrub, preadmission instructions and day of procedure medications, verbalizes understanding, teach back method utilized.  surgeon requesting medical clearance. 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  Patient educated on surgical scrub, preadmission instructions and day of procedure medications, verbalizes understanding, teach back method utilized.  surgeon requesting medical clearance.  Stop Multivitamin 7 days prior to date of surgery.

## 2023-04-08 ENCOUNTER — NON-APPOINTMENT (OUTPATIENT)
Age: 23
End: 2023-04-08

## 2023-04-09 ENCOUNTER — TRANSCRIPTION ENCOUNTER (OUTPATIENT)
Age: 23
End: 2023-04-09

## 2023-04-10 ENCOUNTER — TRANSCRIPTION ENCOUNTER (OUTPATIENT)
Age: 23
End: 2023-04-10

## 2023-04-10 ENCOUNTER — RESULT REVIEW (OUTPATIENT)
Age: 23
End: 2023-04-10

## 2023-04-10 ENCOUNTER — OUTPATIENT (OUTPATIENT)
Dept: OUTPATIENT SERVICES | Facility: HOSPITAL | Age: 23
LOS: 1 days | End: 2023-04-10
Payer: MEDICAID

## 2023-04-10 ENCOUNTER — APPOINTMENT (OUTPATIENT)
Dept: OTOLARYNGOLOGY | Facility: HOSPITAL | Age: 23
End: 2023-04-10

## 2023-04-10 VITALS
SYSTOLIC BLOOD PRESSURE: 125 MMHG | DIASTOLIC BLOOD PRESSURE: 89 MMHG | HEART RATE: 72 BPM | WEIGHT: 244.71 LBS | RESPIRATION RATE: 20 BRPM | HEIGHT: 75 IN | TEMPERATURE: 97 F | OXYGEN SATURATION: 99 %

## 2023-04-10 VITALS
DIASTOLIC BLOOD PRESSURE: 60 MMHG | RESPIRATION RATE: 17 BRPM | SYSTOLIC BLOOD PRESSURE: 110 MMHG | HEART RATE: 78 BPM | OXYGEN SATURATION: 99 %

## 2023-04-10 DIAGNOSIS — R22.1 LOCALIZED SWELLING, MASS AND LUMP, NECK: ICD-10-CM

## 2023-04-10 DIAGNOSIS — Q18.0 SINUS, FISTULA AND CYST OF BRANCHIAL CLEFT: ICD-10-CM

## 2023-04-10 DIAGNOSIS — Z78.9 OTHER SPECIFIED HEALTH STATUS: Chronic | ICD-10-CM

## 2023-04-10 LAB — BLD GP AB SCN SERPL QL: SIGNIFICANT CHANGE UP

## 2023-04-10 PROCEDURE — 88312 SPECIAL STAINS GROUP 1: CPT | Mod: 26

## 2023-04-10 PROCEDURE — 88364 INSITU HYBRIDIZATION (FISH): CPT | Mod: 26

## 2023-04-10 PROCEDURE — 88342 IMHCHEM/IMCYTCHM 1ST ANTB: CPT | Mod: 26

## 2023-04-10 PROCEDURE — 88307 TISSUE EXAM BY PATHOLOGIST: CPT | Mod: 26

## 2023-04-10 PROCEDURE — 88341 IMHCHEM/IMCYTCHM EA ADD ANTB: CPT | Mod: 26

## 2023-04-10 PROCEDURE — 42815 EXCISION OF NECK CYST: CPT | Mod: GC

## 2023-04-10 PROCEDURE — 88365 INSITU HYBRIDIZATION (FISH): CPT | Mod: 26

## 2023-04-10 RX ORDER — HYDROMORPHONE HYDROCHLORIDE 2 MG/ML
0.5 INJECTION INTRAMUSCULAR; INTRAVENOUS; SUBCUTANEOUS
Refills: 0 | Status: DISCONTINUED | OUTPATIENT
Start: 2023-04-10 | End: 2023-04-10

## 2023-04-10 RX ORDER — FENTANYL CITRATE 50 UG/ML
25 INJECTION INTRAVENOUS
Refills: 0 | Status: DISCONTINUED | OUTPATIENT
Start: 2023-04-10 | End: 2023-04-10

## 2023-04-10 RX ORDER — CEPHALEXIN 500 MG
1 CAPSULE ORAL
Qty: 8 | Refills: 0
Start: 2023-04-10 | End: 2023-04-13

## 2023-04-10 RX ORDER — SODIUM CHLORIDE 9 MG/ML
1000 INJECTION, SOLUTION INTRAVENOUS
Refills: 0 | Status: DISCONTINUED | OUTPATIENT
Start: 2023-04-10 | End: 2023-04-10

## 2023-04-10 RX ORDER — OXYCODONE AND ACETAMINOPHEN 5; 325 MG/1; MG/1
1 TABLET ORAL
Qty: 18 | Refills: 0
Start: 2023-04-10 | End: 2023-04-12

## 2023-04-10 RX ORDER — ONDANSETRON 8 MG/1
4 TABLET, FILM COATED ORAL ONCE
Refills: 0 | Status: DISCONTINUED | OUTPATIENT
Start: 2023-04-10 | End: 2023-04-10

## 2023-04-10 RX ORDER — VANCOMYCIN HCL 1 G
1000 VIAL (EA) INTRAVENOUS ONCE
Refills: 0 | Status: COMPLETED | OUTPATIENT
Start: 2023-04-10 | End: 2023-04-10

## 2023-04-10 RX ADMIN — Medication 250 MILLIGRAM(S): at 16:27

## 2023-04-10 NOTE — BRIEF OPERATIVE NOTE - TYPE OF ANESTHESIA
Dr. Ana Sweeney, anesthesia, was contacted and informed of patient's history and planned surgery. Orders received and no clearance required. General

## 2023-04-10 NOTE — ASU DISCHARGE PLAN (ADULT/PEDIATRIC) - NS MD DC FALL RISK RISK
For information on Fall & Injury Prevention, visit: [email or web address] OR  [email or web address] OR  https://www.cdc.gov/steadi/patient.html

## 2023-04-10 NOTE — ASU DISCHARGE PLAN (ADULT/PEDIATRIC) - CARE PROVIDER_API CALL
Jason Perry (MD)  Otolaryngology  [address], [address]  [place] [address]  Phone: [phone]  Fax: [phone]  Established Patient  Scheduled Appointment: 04/12/2023

## 2023-04-10 NOTE — BRIEF OPERATIVE NOTE - NSICDXBRIEFPOSTOP_GEN_ALL_CORE_FT
POST-OP DIAGNOSIS:  Mass of left side of neck 10-Apr-2023 19:21:47  Tod Alvarado   POST-OP DIAGNOSIS:  Branchial cleft cyst 10-Apr-2023 19:35:03 LEFT Tod Alvarado

## 2023-04-11 PROBLEM — R22.1 LOCALIZED SWELLING, MASS AND LUMP, NECK: Chronic | Status: ACTIVE | Noted: 2023-03-31

## 2023-04-11 PROBLEM — Q18.0 SINUS, FISTULA AND CYST OF BRANCHIAL CLEFT: Chronic | Status: ACTIVE | Noted: 2023-03-31

## 2023-04-11 PROBLEM — R13.10 DYSPHAGIA, UNSPECIFIED: Chronic | Status: ACTIVE | Noted: 2023-03-31

## 2023-04-11 PROCEDURE — 88342 IMHCHEM/IMCYTCHM 1ST ANTB: CPT

## 2023-04-11 PROCEDURE — 88108 CYTOPATH CONCENTRATE TECH: CPT | Mod: 26,59

## 2023-04-11 PROCEDURE — 88364 INSITU HYBRIDIZATION (FISH): CPT

## 2023-04-11 PROCEDURE — 86901 BLOOD TYPING SEROLOGIC RH(D): CPT

## 2023-04-11 PROCEDURE — 21554 EXC NECK TUM DEEP 5 CM/>: CPT

## 2023-04-11 PROCEDURE — 87075 CULTR BACTERIA EXCEPT BLOOD: CPT

## 2023-04-11 PROCEDURE — 81261 IGH GENE REARRANGE AMP METH: CPT

## 2023-04-11 PROCEDURE — 88341 IMHCHEM/IMCYTCHM EA ADD ANTB: CPT

## 2023-04-11 PROCEDURE — 88189 FLOWCYTOMETRY/READ 16 & >: CPT

## 2023-04-11 PROCEDURE — 86900 BLOOD TYPING SEROLOGIC ABO: CPT

## 2023-04-11 PROCEDURE — 88360 TUMOR IMMUNOHISTOCHEM/MANUAL: CPT

## 2023-04-11 PROCEDURE — 87070 CULTURE OTHR SPECIMN AEROBIC: CPT

## 2023-04-11 PROCEDURE — 88185 FLOWCYTOMETRY/TC ADD-ON: CPT

## 2023-04-11 PROCEDURE — 88312 SPECIAL STAINS GROUP 1: CPT

## 2023-04-11 PROCEDURE — 36415 COLL VENOUS BLD VENIPUNCTURE: CPT

## 2023-04-11 PROCEDURE — 81264 IGK REARRANGEABN CLONAL POP: CPT

## 2023-04-11 PROCEDURE — 86850 RBC ANTIBODY SCREEN: CPT

## 2023-04-11 PROCEDURE — 87205 SMEAR GRAM STAIN: CPT

## 2023-04-11 PROCEDURE — 88365 INSITU HYBRIDIZATION (FISH): CPT

## 2023-04-11 PROCEDURE — 88307 TISSUE EXAM BY PATHOLOGIST: CPT

## 2023-04-11 PROCEDURE — 88184 FLOWCYTOMETRY/ TC 1 MARKER: CPT

## 2023-04-12 ENCOUNTER — APPOINTMENT (OUTPATIENT)
Dept: OTOLARYNGOLOGY | Facility: CLINIC | Age: 23
End: 2023-04-12
Payer: MEDICAID

## 2023-04-12 VITALS
HEIGHT: 63 IN | WEIGHT: 245 LBS | HEART RATE: 76 BPM | BODY MASS INDEX: 43.41 KG/M2 | DIASTOLIC BLOOD PRESSURE: 82 MMHG | SYSTOLIC BLOOD PRESSURE: 139 MMHG

## 2023-04-12 LAB — TM INTERPRETATION: SIGNIFICANT CHANGE UP

## 2023-04-12 PROCEDURE — 99024 POSTOP FOLLOW-UP VISIT: CPT

## 2023-04-12 RX ORDER — VALACYCLOVIR 1 G/1
1 TABLET, FILM COATED ORAL
Qty: 14 | Refills: 0 | Status: DISCONTINUED | COMMUNITY
Start: 2022-05-02

## 2023-04-12 RX ORDER — DEXAMETHASONE 4 MG/1
TABLET ORAL
Refills: 0 | Status: ACTIVE | COMMUNITY

## 2023-04-12 RX ORDER — AMOXICILLIN AND CLAVULANATE POTASSIUM 875; 125 MG/1; MG/1
875-125 TABLET, COATED ORAL
Qty: 14 | Refills: 0 | Status: DISCONTINUED | COMMUNITY
Start: 2022-11-12

## 2023-04-12 NOTE — PROCEDURE
[FreeTextEntry1] : Drain removal [FreeTextEntry2] : Post-op [FreeTextEntry3] : L neck drain removed without difficulty.\par \par Steris replaced.
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
## 2023-04-12 NOTE — REASON FOR VISIT
[Post-Operative Visit] : a post-operative visit [FreeTextEntry2] : s/p excision of left branchial cleft cyst 4/10/23

## 2023-04-12 NOTE — CONSULT LETTER
[Dear  ___] : Dear  [unfilled], [Consult Letter:] : I had the pleasure of evaluating your patient, [unfilled]. [Please see my note below.] : Please see my note below. [Consult Closing:] : Thank you very much for allowing me to participate in the care of this patient.  If you have any questions, please do not hesitate to contact me. [Sincerely,] : Sincerely, [FreeTextEntry2] : Chris Baltazar MD [FreeTextEntry3] : Jason Perry MD, FACS\par Chief of Otolaryngology and Head & Neck Surgery\par [facility]\par  - Dept. of Otolaryngology\par [facility]

## 2023-04-15 LAB
CULTURE RESULTS: NO GROWTH — SIGNIFICANT CHANGE UP
SPECIMEN SOURCE: SIGNIFICANT CHANGE UP

## 2023-04-19 ENCOUNTER — APPOINTMENT (OUTPATIENT)
Dept: OTOLARYNGOLOGY | Facility: CLINIC | Age: 23
End: 2023-04-19
Payer: SELF-PAY

## 2023-04-19 VITALS
HEIGHT: 75 IN | SYSTOLIC BLOOD PRESSURE: 130 MMHG | DIASTOLIC BLOOD PRESSURE: 81 MMHG | BODY MASS INDEX: 30.46 KG/M2 | WEIGHT: 245 LBS | HEART RATE: 74 BPM

## 2023-04-19 DIAGNOSIS — Q18.0 SINUS, FISTULA AND CYST OF BRANCHIAL CLEFT: ICD-10-CM

## 2023-04-19 PROCEDURE — 99024 POSTOP FOLLOW-UP VISIT: CPT

## 2023-04-19 NOTE — CONSULT LETTER
[Dear  ___] : Dear  [unfilled], [Consult Letter:] : I had the pleasure of evaluating your patient, [unfilled]. [Please see my note below.] : Please see my note below. [Consult Closing:] : Thank you very much for allowing me to participate in the care of this patient.  If you have any questions, please do not hesitate to contact me. [Sincerely,] : Sincerely, [FreeTextEntry2] : Chris Baltazar MD [FreeTextEntry3] : Jason Perry MD, FACS\par Chief of Otolaryngology and Head & Neck Surgery\par [facility]\par  - Dept. of Otolaryngology\par [facility]

## 2023-04-19 NOTE — PHYSICAL EXAM
[Midline] : trachea located in midline position [Normal] : no rashes [de-identified] : L neck wound C/D/I.  Sutures removed. [de-identified] : Edematous [de-identified] : 3+

## 2023-04-19 NOTE — HISTORY OF PRESENT ILLNESS
[de-identified] : 22 year old male presents for follow up s/p excision of left branchial cleft cyst 4/10/23. States incision site area bled for two days after drain removal, when he moves his head feels tightness on his neck and has discomfort when swallowing. Currently still taking Keflex. Denies fevers, dysphagia, dyspnea, dysphonia or otalgia. \par \par  \par  \par

## 2023-04-19 NOTE — REASON FOR VISIT
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

## 2023-04-26 LAB — SURGICAL PATHOLOGY STUDY: SIGNIFICANT CHANGE UP

## 2023-05-03 ENCOUNTER — NON-APPOINTMENT (OUTPATIENT)
Age: 23
End: 2023-05-03

## 2023-05-11 ENCOUNTER — APPOINTMENT (OUTPATIENT)
Dept: OTOLARYNGOLOGY | Facility: CLINIC | Age: 23
End: 2023-05-11
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
## 2023-06-26 ENCOUNTER — NON-APPOINTMENT (OUTPATIENT)
Age: 23
End: 2023-06-26

## 2024-01-08 ENCOUNTER — EMERGENCY (EMERGENCY)
Facility: HOSPITAL | Age: 24
LOS: 0 days | Discharge: HOME | End: 2024-01-08
Attending: STUDENT IN AN ORGANIZED HEALTH CARE EDUCATION/TRAINING PROGRAM
Payer: COMMERCIAL

## 2024-01-08 VITALS
DIASTOLIC BLOOD PRESSURE: 83 MMHG | OXYGEN SATURATION: 99 % | RESPIRATION RATE: 18 BRPM | SYSTOLIC BLOOD PRESSURE: 130 MMHG | HEART RATE: 79 BPM | TEMPERATURE: 98 F

## 2024-01-08 VITALS — WEIGHT: 250 LBS | HEIGHT: 75 IN

## 2024-01-08 DIAGNOSIS — S09.90XA UNSPECIFIED INJURY OF HEAD, INITIAL ENCOUNTER: ICD-10-CM
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
## 2024-01-08 DIAGNOSIS — Z78.9 OTHER SPECIFIED HEALTH STATUS: Chronic | ICD-10-CM
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
## 2024-01-08 PROCEDURE — 99284 EMERGENCY DEPT VISIT MOD MDM: CPT

## 2024-01-08 PROCEDURE — 70450 CT HEAD/BRAIN W/O DYE: CPT | Mod: MA

## 2024-01-08 PROCEDURE — 99284 EMERGENCY DEPT VISIT MOD MDM: CPT | Mod: 25

## 2024-01-08 PROCEDURE — 70450 CT HEAD/BRAIN W/O DYE: CPT | Mod: 26,MA

## 2024-01-08 NOTE — ED STATDOCS - ATTENDING APP SHARED VISIT CONTRIBUTION OF CARE
I, Dulce Sandoval MD,  I personally saw the pateint and performed a substantive portion of the visit including the following: initial face to face bedside interview with this patient regarding history of present illness, review of symptoms and relevant past medical, social and family history, independent physical examination, and medical decision making. I was the initial provider who evaluated this patient. I have discussed I have signed out the follow up of any pending tests (i.e. labs, radiological studies) to the PA/NP. I have communicated the patient’s plan of care and disposition with the PA/NP.  The history, relevant review of systems, past medical and surgical history, medical decision making, and physical examination was documented by the scribe in my presence and I attest to the accuracy of the documentation.

## 2024-01-08 NOTE — ED STATDOCS - PATIENT PORTAL LINK FT
You can access the FollowMyHealth Patient Portal offered by [facility] by registering at the following website: [email or web address]. By joining [employer]’s FollowMyHealth portal, you will also be able to view your health information using other applications (apps) compatible with our system. You can access the FollowMyHealth Patient Portal offered by [facility] by registering at the following website: [email or web address]. By joining [employer]’s FollowMyHealth portal, you will also be able to view your health information using other applications (apps) compatible with our system.
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
## 2024-01-08 NOTE — ED ADULT NURSE NOTE - PRO INTERPRETER NEED 2
English
Quality 226: Preventive Care And Screening: Tobacco Use: Screening And Cessation Intervention: Patient screened for tobacco use and is an ex/non-smoker
Quality 130: Documentation Of Current Medications In The Medical Record: Current Medications Documented
Quality 137: Melanoma: Continuity Of Care - Recall System: Patient information entered into a recall system that includes: target date for the next exam specified AND a process to follow up with patients regarding missed or unscheduled appointments
Quality 111:Pneumonia Vaccination Status For Older Adults: Patient received any pneumococcal conjugate or polysaccharide vaccine on or after their 60th birthday and before the end of the measurement period
Detail Level: Detailed
Quality 431: Preventive Care And Screening: Unhealthy Alcohol Use - Screening: Patient not identified as an unhealthy alcohol user when screened for unhealthy alcohol use using a systematic screening method
Quality 110: Preventive Care And Screening: Influenza Immunization: Influenza Immunization previously received during influenza season
Quality 138: Melanoma: Coordination Of Care: A treatment plan was communicated to the physicians providing continuing care within one month of diagnosis outlining: diagnosis, tumor thickness and a plan for surgery or alternate care.
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
## 2024-01-08 NOTE — ED ADULT NURSE NOTE - NSFALLUNIVINTERV_ED_ALL_ED
Bed/Stretcher in lowest position, wheels locked, appropriate side rails in place/Call bell, personal items and telephone in reach/Instruct patient to call for assistance before getting out of bed/chair/stretcher/Non-slip footwear applied when patient is off stretcher/[place] to call system/Physically safe environment - no spills, clutter or unnecessary equipment/Purposeful proactive rounding/Room/bathroom lighting operational, light cord in reach Bed/Stretcher in lowest position, wheels locked, appropriate side rails in place/Call bell, personal items and telephone in reach/Instruct patient to call for assistance before getting out of bed/chair/stretcher/Non-slip footwear applied when patient is off stretcher/[place] to call system/Physically safe environment - no spills, clutter or unnecessary equipment/Purposeful proactive rounding/Room/bathroom lighting operational, light cord in reach
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
## 2024-01-08 NOTE — ED STATDOCS - NSFOLLOWUPINSTRUCTIONS_ED_ALL_ED_FT
Head Injury, Adult    There are many types of head injuries. Head injuries can be as minor as a small bump, or they can be a serious medical issue. More severe head injuries include:  A jarring injury to the brain (concussion).  A bruise (contusion) of the brain. This means there is bleeding in the brain that can cause swelling.  A cracked skull (skull fracture).  Bleeding in the brain that collects, clots, and forms a bump (hematoma).  After a head injury, most problems occur within the first 24 hours, but side effects may occur up to 7–10 days after the injury. It is important to watch your condition for any changes. You may need to be observed in the emergency department or urgent care, or you may be admitted to the hospital.    What are the causes?  There are many possible causes of a head injury. Serious head injuries may be caused by car accidents, bicycle or motorcycle accidents, sports injuries, falls, or being struck by an object.    What are the symptoms?  Symptoms of a head injury include a contusion, bump, or bleeding at the site of the injury. Other physical symptoms may include:  Headache.  Nausea or vomiting.  Dizziness.  Blurred or double vision.  Being uncomfortable around bright lights or loud noises.  Seizures.  Feeling tired.  Trouble being awakened.  Loss of consciousness.  Mental or emotional symptoms may include:  Irritability.  Confusion and memory problems.  Poor attention and concentration.  Changes in eating or sleeping habits.  Anxiety or depression.  How is this diagnosed?  This condition can usually be diagnosed based on your symptoms, a description of the injury, and a physical exam. You may also have imaging tests done, such as a CT scan or an MRI.    How is this treated?  Treatment for this condition depends on the severity and type of injury you have. The main goal of treatment is to prevent complications and allow the brain time to heal.    Mild head injury    If you have a mild head injury, you may be sent home, and treatment may include:  Observation. A responsible adult should stay with you for 24 hours after your injury and check on you often.  Physical rest.  Brain rest.  Pain medicines.  Severe head injury    If you have a severe head injury, treatment may include:  Close observation. This includes hospitalization with the following care:  Frequent physical exams.  Frequent checks of how your brain and nervous system are working (neurological status).  Checking your blood pressure and oxygen levels.  Medicines to relieve pain, prevent seizures, and decrease brain swelling.  Airway protection and breathing support. This may include using a ventilator.  Treatments that monitor and manage swelling inside the brain.  Brain surgery. This may be needed to:  Remove a collection of blood or blood clots.  Stop the bleeding.  Remove a part of the skull to allow room for the brain to swell.  Follow these instructions at home:  Activity    Rest and avoid activities that are physically hard or tiring.  Make sure you get enough sleep.  Let your brain rest by limiting activities that require a lot of thought or attention, such as:  Watching TV.  Playing memory games and puzzles.  Job-related work or homework.  Working on the computer, using social media, and texting.  Avoid activities that could cause another head injury, such as playing sports, until your health care provider approves. Having another head injury, especially before the first one has healed, can be dangerous.  Ask your health care provider when it is safe for you to return to your regular activities, including work or school. Ask your health care provider for a step-by-step plan for gradually returning to activities.  Ask your health care provider when you can drive, ride a bicycle, or use heavy machinery. Your ability to react may be slower after a brain injury. Do not do these activities if you are dizzy.  Lifestyle      Do not drink alcohol until your health care provider approves. Do not use drugs. Alcohol and certain drugs may slow your recovery and can put you at risk of further injury.  If it is harder than usual to remember things, write them down.  If you are easily distracted, try to do one thing at a time.  Talk with family members or close friends when making important decisions.  Tell your friends, family, a trusted colleague, and  about your injury, symptoms, and restrictions. Have them watch for any new or worsening problems.  General instructions    Take over-the-counter and prescription medicines only as told by your health care provider.  Have someone stay with you for 24 hours after your head injury. This person should watch you for any changes in your symptoms and be ready to seek medical help.  Keep all follow-up visits as told by your health care provider. This is important.  How is this prevented?  Work on improving your balance and strength to avoid falls.  Wear a seat belt when you are in a moving vehicle.  Wear a helmet when riding a bicycle, skiing, or doing any other sport or activity that has a risk of injury.  If you drink alcohol:  Limit how much you use to:  0–1 drink a day for nonpregnant women.  0–2 drinks a day for men.  Be aware of how much alcohol is in your drink. In the [place] one drink equals one 12 oz bottle of beer (355 mL), one 5 oz glass of wine (148 mL), or one 1½ oz glass of hard liquor (44 mL).  Take safety measures in your home, such as:  Removing clutter and tripping hazards from floors and stairways.  Using grab bars in bathrooms and handrails by stairs.  Placing non-slip mats on floors and in bathtubs.  Improving lighting in dim areas.  Where to find more information  Centers for Disease Control and Prevention: www.cdc.gov  Get help right away if:  You have:  A severe headache that is not helped by medicine.  Trouble walking or weakness in your arms and legs.  Clear or bloody fluid coming from your nose or ears.  Changes in your vision.  A seizure.  Increased confusion or irritability.  Your symptoms get worse.  You are sleepier than normal and have trouble staying awake.  You lose your balance.  Your pupils change size.  Your speech is slurred.  Your dizziness gets worse.  You vomit.  These symptoms may represent a serious problem that is an emergency. Do not wait to see if the symptoms will go away. Get medical help right away. Call your local emergency services (911 in the [place] Do not drive yourself to the hospital.    Summary  Head injuries can be minor, or they can be a serious medical issue requiring immediate attention.  Treatment for this condition depends on the severity and type of injury you have.  Have someone stay with you for 24 hours after your injury and check on you often.  Ask your health care provider when it is safe for you to return to your regular activities, including work or school.  Head injury prevention includes wearing a seat belt in a motor vehicle, using a helmet on a bicycle, limiting alcohol use, and taking safety measures in your home.  This information is not intended to replace advice given to you by your health care provider. Make sure you discuss any questions you have with your health care provider.        Motor Vehicle Collision Injury, Adult  After a motor vehicle collision, it is common to have injuries to the head, face, arms, and body. These injuries may include cuts, burns, and bruises. The collision can also cause sore muscles, muscle strains, headaches, and broken bones.    You may have stiffness and soreness for the first several hours. You may feel worse after waking up the first morning after the collision. These injuries tend to feel worse for the first 24–48 hours. Your injuries should then begin to improve with each day. How quickly you improve often depends on:  The severity of the collision.  The number of injuries you have.  The location and nature of the injuries.  Whether you were wearing a seat belt and whether your airbag deployed.  A head injury may result in a concussion, which is a brain injury that can have serious effects. If you have a concussion, you should rest as told by your health care provider. You must be very careful to avoid having a second concussion.    Follow these instructions at home:  Medicines    Take over-the-counter and prescription medicines only as told by your health care provider.  If you were prescribed antibiotics, take or apply it as told by your health care provider. Do not stop using the antibiotic even if you start to feel better.  Wound or burn care    Two wounds closed with skin glue. One is normal. The other is red with pus and infected.  Follow instructions from your health care provider about how to take care of your wound or burn. Make sure you:  Clean your wound or burn. To do this:  Wash it with mild soap and water.  Rinse it with water to remove all soap.  Pat it dry with a clean towel. Do not rub it.  Put an ointment or cream on the wound, if you were told to do so.  Know when and how to change or remove your bandage (dressing). Always wash your hands with soap and water for at least 20 seconds before and after you change your dressing. If soap and water are not available, use hand .  Leave any stitches (sutures), skin glue, or adhesive strips in place. These skin closures may need to stay in place for 2 weeks or longer. If adhesive strip edges start to loosen and curl up, you may trim the loose edges. Do not remove adhesive strips completely unless your health care provider tells you to do that.  Avoid exposing your burn or wound to the sun.  Keep the surface of the wound or burn intact.  Do not scratch or pick at the wound or burn.  Do not break any blisters you may have.  Do not peel any skin.  Check your wound or burn every day for signs of infection. Check for:  Redness, swelling, or pain.  Fluid or blood.  Warmth.  Pus or a bad smell.  Managing pain, stiffness, and swelling    Bag of ice on a towel on the skin.  If directed, put ice on the injured areas. This can help with pain and swelling. To do this:  Put ice in a plastic bag.  Place a towel between your skin and the bag.  Leave the ice on for 20 minutes, 2–3 times a day.  If your skin turns bright red, remove the ice right away to prevent skin damage. The risk of skin damage is higher if you cannot feel pain, heat, or cold.  Raise (elevate) the wound or burn above the level of your heart while you are sitting or lying down. This will help reduce pain, pressure, and swelling.  If you have a wound or burn on your face, you may want to sleep with your head elevated. You may do this by putting an extra pillow under your head.  Activity    Rest. Rest helps your body to heal. Make sure you:  Get plenty of sleep at night. Avoid staying up late.  Keep the same bedtime hours on weekends and weekdays.  You may have to avoid lifting. Ask your health care provider how much you can safely lift. Lifting can make neck or back pain worse.  Ask your health care provider when you can drive, ride a bicycle, or use machinery. Your ability to react may be slower if you injured your head. Do not do these activities if you are dizzy.  General instructions    If you have a splint, brace, or sling, follow your health care provider's instructions on how to use your device.  Drink enough fluid to keep your urine pale yellow.  Do not drink alcohol.  Eat a healthy diet. Ask your health care provider what foods you should eat.  Contact a health care provider if:  You have any new or worsening symptoms, such as:  A worsening headache  Pain or swelling in an arm or leg.  Numbness, tingling, or weakness in your arms or legs.  Trouble moving an arm or leg.  New neck or back pain.  Nausea or vomiting  You have signs of infection in a wound or burn.  You have a fever.  You have a head injury and any of the following symptoms for more than 2 weeks after your motor vehicle collision:  Headaches that do not go away.  Dizziness or balance problems.  Nausea or vomiting.  Increased sensitivity to noise or light.  Depression, anxiety, or irritability and mood swings.  Memory problems or trouble concentrating.  Sleep problems or feeling more tired than usual.  You have changes in bowel or bladder control.  You have blood in your urine, stool, or you vomit.  Get help right away if:  You have increasing pain in the chest, neck, back, or abdomen.  You have shortness of breath.  These symptoms may be an emergency. Get help right away. Call 911.  Do not wait to see if the symptoms will go away.  Do not drive yourself to the hospital.  This information is not intended to replace advice given to you by your health care provider. Make sure you discuss any questions you have with your health care provider.

## 2024-08-29 ENCOUNTER — APPOINTMENT (OUTPATIENT)
Dept: OTOLARYNGOLOGY | Facility: CLINIC | Age: 24
End: 2024-08-29
Payer: MEDICAID

## 2024-08-29 VITALS
BODY MASS INDEX: 31.71 KG/M2 | SYSTOLIC BLOOD PRESSURE: 137 MMHG | HEART RATE: 73 BPM | WEIGHT: 255 LBS | DIASTOLIC BLOOD PRESSURE: 86 MMHG | HEIGHT: 75 IN

## 2024-08-29 DIAGNOSIS — J03.91 ACUTE RECURRENT TONSILLITIS, UNSPECIFIED: ICD-10-CM

## 2024-08-29 DIAGNOSIS — J31.0 CHRONIC RHINITIS: ICD-10-CM

## 2024-08-29 DIAGNOSIS — R22.1 LOCALIZED SWELLING, MASS AND LUMP, NECK: ICD-10-CM

## 2024-08-29 PROCEDURE — 99212 OFFICE O/P EST SF 10 MIN: CPT

## 2024-08-29 RX ORDER — FLUTICASONE PROPIONATE 50 UG/1
50 SPRAY, METERED NASAL DAILY
Qty: 1 | Refills: 11 | Status: ACTIVE | COMMUNITY
Start: 2024-08-29 | End: 1900-01-01

## 2024-08-29 NOTE — REASON FOR VISIT
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  Now with nasal congestion and recurrent pharyngitis/tonsillitis

## 2024-08-29 NOTE — CONSULT LETTER
[Dear  ___] : Dear  [unfilled], [Please see my note below.] : Please see my note below. [Sincerely,] : Sincerely, [Courtesy Letter:] : I had the pleasure of seeing your patient, [unfilled], in my office today. [FreeTextEntry2] : Livan Gunderson MD [FreeTextEntry3] : Jason Perry MD, FACS\par  Chief of Otolaryngology and Head & Neck Surgery\par  [facility]\par   - Dept. of Otolaryngology\par  [facility]

## 2024-08-29 NOTE — HISTORY OF PRESENT ILLNESS
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 Pt wants to follow up on swollen lymph node on left neck, nasal congestion and recurring tonsilitis. Reports sinus pressure, intermittent anterior rhinorrhea, PND, recurrent sinus infections about 6x/year, globus, odynophagia, dysphonia, frequent mucus and throat clearing. Denies sinus pain and anosmia, current nasal/allergy medications/ steroidal sprays, CT scan of sinuses. Patient denies cryptic tonsils, tonsil stones, halitosis, dysphagia, .  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 States incision site area bled for two days after drain removal, when he moves his head feels tightness on his neck and has discomfort when swallowing. Currently still taking Keflex. Denies fevers, dysphagia, dyspnea, dysphonia or otalgia.

## 2024-08-29 NOTE — PHYSICAL EXAM
[Midline] : trachea located in midline position [Normal] : no rashes [de-identified] : L neck scar well healed.  No keloid. [de-identified] : Edematous [de-identified] : 3+

## 2024-08-29 NOTE — ASSESSMENT
[FreeTextEntry1] : Trial of Fluticasone spray recommended.  If no better, pt will be considered for septoplasty and turbinate SMR.

## (undated) DEVICE — CANISTER DISPOSABLE THIN WALL 3000CC

## (undated) DEVICE — PROTECTOR HEEL / ELBOW FLUFFY

## (undated) DEVICE — LONE STAR RETRACTOR RING 12MM BLUNT DISP

## (undated) DEVICE — DRAPE 3/4 SHEET 52X76"

## (undated) DEVICE — ELCTR BOVIE TIP BLADE INSULATED 6.5" EDGE

## (undated) DEVICE — SUT PROLENE 4-0 18" PS-2

## (undated) DEVICE — VENODYNE/SCD SLEEVE CALF MEDIUM

## (undated) DEVICE — PACK HEAD & NECK

## (undated) DEVICE — LABELS BLANK W PEN

## (undated) DEVICE — GLV 7.5 PROTEXIS (WHITE)

## (undated) DEVICE — DRAPE SPLIT SHEET 77" X 120"

## (undated) DEVICE — DRSG STERISTRIPS 0.5 X 4"

## (undated) DEVICE — ELCTR BOVIE TIP BLADE INSULATED 2.75" EDGE

## (undated) DEVICE — SUT VICRYL 2-0 18" TIES UNDYED

## (undated) DEVICE — GLV 8 PROTEXIS (CREAM) MICRO

## (undated) DEVICE — SUT MONOCRYL 5-0 18" P-3 UNDYED

## (undated) DEVICE — ELCTR GROUNDING PAD ADULT COVIDIEN

## (undated) DEVICE — SUT CHROMIC 3-0 27" RB-1

## (undated) DEVICE — BIPOLAR FORCEP KIRWAN JEWELERS STR 4" X 0.4MM W 12FT CORD (GREEN)

## (undated) DEVICE — DRAIN JACKSON PRATT 7FR ROUND END NO TROCAR

## (undated) DEVICE — PREP CHLORAPREP HI-LITE ORANGE 26ML

## (undated) DEVICE — DRSG BENZOIN 0.6CC

## (undated) DEVICE — DRAPE MAGNETIC INSTRUMENT MEDIUM

## (undated) DEVICE — SOL IRR POUR H2O 1500ML

## (undated) DEVICE — SUT CHROMIC 4-0 27" RB-1